# Patient Record
Sex: MALE | Race: WHITE | NOT HISPANIC OR LATINO | Employment: OTHER | URBAN - METROPOLITAN AREA
[De-identification: names, ages, dates, MRNs, and addresses within clinical notes are randomized per-mention and may not be internally consistent; named-entity substitution may affect disease eponyms.]

---

## 2017-10-16 ENCOUNTER — OFFICE VISIT (OUTPATIENT)
Dept: NEUROLOGY | Facility: CLINIC | Age: 82
End: 2017-10-16
Payer: MEDICARE

## 2017-10-16 VITALS
WEIGHT: 163.38 LBS | DIASTOLIC BLOOD PRESSURE: 74 MMHG | BODY MASS INDEX: 24.76 KG/M2 | HEART RATE: 58 BPM | HEIGHT: 68 IN | SYSTOLIC BLOOD PRESSURE: 122 MMHG

## 2017-10-16 DIAGNOSIS — R25.1 TREMOR: Primary | ICD-10-CM

## 2017-10-16 PROCEDURE — 99203 OFFICE O/P NEW LOW 30 MIN: CPT | Mod: PBBFAC | Performed by: NURSE PRACTITIONER

## 2017-10-16 PROCEDURE — 99205 OFFICE O/P NEW HI 60 MIN: CPT | Mod: S$PBB,,, | Performed by: NURSE PRACTITIONER

## 2017-10-16 PROCEDURE — 99999 PR PBB SHADOW E&M-NEW PATIENT-LVL III: CPT | Mod: PBBFAC,,, | Performed by: NURSE PRACTITIONER

## 2017-10-16 RX ORDER — TAMSULOSIN HYDROCHLORIDE 0.4 MG/1
1 CAPSULE ORAL NIGHTLY
Refills: 2 | COMMUNITY
Start: 2017-10-12

## 2017-10-16 RX ORDER — ATORVASTATIN CALCIUM 10 MG/1
10 TABLET, FILM COATED ORAL DAILY
Refills: 3 | COMMUNITY
Start: 2017-07-21 | End: 2021-04-18

## 2017-10-16 NOTE — PROGRESS NOTES
Name: Austin Linn  MRN: [id number]   CSN: [id number]      Date: 10-16-17      Referring physician:  Aaarefruddy Self  No address on file    Subjective:      Chief Complaint: tremors    History of Present Illness (HPI):    Austin Linn is a 81 y.o. right-handed male who presents today for an initial evaluation of tremor and is accompanied by wife (who is also seen in this office).     He says his internist told him he has ET. He has never taken medicine for this and has never felt meds were necessary. He still is not interested in meds for tremor control at this point but wants to have the tremor further evaluated. 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 He was more aware of it as day went on. 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     Tremor is aggravated by caffeine and soothed by glass of wine. If stressed, it is worse. When questioned, he thinks he only sees this in the RH.     Drinks coffee in AM and sometimes in afternoon. Aware of tremor at times when drinking coffee and tremor when drinking coffee, picking up/ carrying plate. Not aware of tremor with shaving.   Not aware of tremor with fine motor movements around house-like using drill bit- unless he has had coffee beforehand.    Handwriting is not always legible and is definitely different than it once was. Not sure if writing is smaller.      No shuffling. NOo balance issues.   Some muscle stiffness if sits too long. Notes in hips and upper thighs only.    No falls.   Feels he is slower when working at desk than he once was. Not significant but is aware. Physically, he is is not aware of slowness.     Mother had tremor. Brother is  5 yrs older and has recently developed tremor.     Review of Systems   Constitutional: Positive for fatigue.   HENT: Positive  "for hearing loss. Negative for trouble swallowing and voice change.    Eyes: Negative for visual disturbance.   Respiratory: Negative for choking and shortness of breath.    Cardiovascular: Negative for chest pain and leg swelling.   Gastrointestinal: Negative for constipation, diarrhea and nausea.   Genitourinary: Negative for frequency and urgency.   Musculoskeletal: Negative for gait problem and neck pain.   Skin: Negative for rash.        Airborne allergies - contact dermatitis   Neurological: Positive for tremors. Negative for dizziness, syncope, speech difficulty, weakness and light-headedness.   Hematological: Bruises/bleeds easily.   Psychiatric/Behavioral: Negative for agitation, confusion, hallucinations and sleep disturbance. The patient is not nervous/anxious.        Past Medical History: The patient  has no past medical history on file.    Social History: The patient  reports that he has never smoked. He has never used smokeless tobacco. He reports that he drinks about 4.2 oz of alcohol per week .    Family History: Their family history includes Cancer in his father; Heart disease in his brother and mother; Parkinsonism in his cousin; Tremor in his brother and mother.    Allergies: Review of patient's allergies indicates no known allergies.     Meds:   No current outpatient prescriptions on file prior to visit.     No current facility-administered medications on file prior to visit.        Objective:     Physical Exam:    Vitals:    10/16/17 1512   BP: 122/74   Pulse: (!) 58   Weight: 74.1 kg (163 lb 5.8 oz)   Height: 5' 8" (1.727 m)     Body mass index is 24.84 kg/m².    Constitutional  Well-developed, well-nourished, appears stated age   Cardiovascular  Radial pulses 2+ and symmetric, no LE edema bilaterally     ..  * Cranial nerves       - CN II  PERRL, visual fields full to confrontation     - CN III, IV, VI  Extraocular movements full, normal pursuits and saccades     - CN V  Sensation V1 - V3 " intact     - CN VII  Face strong and symmetric bilaterally     - CN VIII  Hearing intact bilaterally     - CN IX, X  Palate raises midline and symmetric     - CN XI  SCM and trapezius 5/5 bilaterally     - CN XII  Tongue midline   * Motor  Muscle bulk normal, strength 5/5 throughout   * Coordination  No dysmetria with finger-to-nose or heel-to-shin   * Gait  See below.   * Deep tendon reflexes  1+ and symmetric throughout     ..  * Specialized movement exam  No hypophonic speech.      No facial masking.     Cogwheel rigidity-  Neck- 1- RUE- slt but does not increase on diversion. LUE- essentially normal. RLE-ess normal  LLE- perhaps mild.      YESENIA- no bradykinesia. Little clumsy with B Heel toe only.      Subtle tremor with rest RH and subtle rest tremor emerged on diversion in LH.   With arms outstretched- slight tremor BH (RH>LH)- low frequency, moderate amplitude.   With arms pulled to core, increase slightly only on each side but still does not rise to mild.    No kinetic tremor.   Slight to nearing mild tremor with intention bilaterally.        No other dystonia, chorea, athetosis, myoclonus, or tics.       No motor impersistence.   Normal-based gait.   No shortened stride length.   No abnormal arm swing.     No postural instability.   SEE HANDWRITING SAMPLE BELOW             Laboratory Results:  No results found for any previous visit.       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 Caffeine aggravates and wine soothes it. He only notes in RH (dominant) but he does have in LH as well.   At this point, most consistent with Essential Tremor but is present at rest BH.   Query mild rigidity LLE only. Watch.   Not interested in meds.   Offered MRI brain. Not interested at this point. If changes his mind, he will let me know.  Call for problems or increased tremors.   Follow up one year, unless needed  sooner.     I spent 60 minutes face-to-face with the patient with >50% of the time spent with counseling and education regarding:  - results of data, diagnosis, and recommendations stated above  - the prognosis of tremor, ET, discussed PD as well as has questions  - risks and benefits of treatment  - importance of diet and exercise    Isaura Wilson, OMAIRA, NP-C  Division of Movement and Memory Disorders  [facility]  [phone]

## 2018-10-17 ENCOUNTER — OFFICE VISIT (OUTPATIENT)
Dept: NEUROLOGY | Facility: CLINIC | Age: 83
End: 2018-10-17
Payer: MEDICARE

## 2018-10-17 VITALS
WEIGHT: 167.56 LBS | SYSTOLIC BLOOD PRESSURE: 126 MMHG | HEART RATE: 58 BPM | HEIGHT: 68 IN | DIASTOLIC BLOOD PRESSURE: 71 MMHG | BODY MASS INDEX: 25.39 KG/M2

## 2018-10-17 DIAGNOSIS — G25.0 ESSENTIAL TREMOR: Primary | ICD-10-CM

## 2018-10-17 PROCEDURE — 99999 PR PBB SHADOW E&M-EST. PATIENT-LVL III: CPT | Mod: PBBFAC,,, | Performed by: NURSE PRACTITIONER

## 2018-10-17 PROCEDURE — 99213 OFFICE O/P EST LOW 20 MIN: CPT | Mod: PBBFAC | Performed by: NURSE PRACTITIONER

## 2018-10-17 PROCEDURE — 99214 OFFICE O/P EST MOD 30 MIN: CPT | Mod: S$PBB,,, | Performed by: NURSE PRACTITIONER

## 2018-10-17 RX ORDER — MUPIROCIN 20 MG/G
OINTMENT TOPICAL
Refills: 1 | COMMUNITY
Start: 2018-08-24 | End: 2021-04-18

## 2018-10-17 RX ORDER — DOXYCYCLINE 100 MG/1
100 CAPSULE ORAL 2 TIMES DAILY
Refills: 0 | COMMUNITY
Start: 2018-10-12 | End: 2021-04-18

## 2018-10-17 NOTE — PROGRESS NOTES
Name: Austin Linn  MRN: [id number]   CSN: [id number]      Date: 10-17-18      Referring physician:  Aaarefruddy Self  No address on file    Subjective:      Chief Complaint: tremor    History of Present Illness (HPI):    Austin Linn is a 82 y.o. right-handed male who presents today for a follow-up evaluation of tremor (ET)and is accompanied by wife. Initially seen in office 10-16-17.     Tremor does not typically bother him. He continues to be more aware of tremor in the RH but sees in LH as well. He does not feel tremor has increased since last visit.   Caffeine will increase tremor. Also notes tremor with stress and excitement.   Tremor does not affect his activities.  Notes that wine does help tremor.     Denies slowness.   Aware of muscle stiffness if sits too long watching a movie.  Balance fine. No falls.     In the spring, he was spednign a lto of time in flower beds and waas noting that the 3rd and 4th difigt of both hands were tight in the tendones. He wouel rub the fingers and he would get some relieft. He wondereerd if it was trigger finger. Hot water seemd to help aslo. As times has gone on, he as felt the RH is back to 100% and LH is 90%         History of Present Illness (HPI):     Austin Linn is a 81 y.o. right-handed male who presents today for an initial evaluation of tremor and is accompanied by wife (who is also seen in this office).      He says his internist told him he has ET. He has never taken medicine for this and has never felt meds were necessary. He still is not interested in meds for tremor control at this point but wants to have the tremor further evaluated. 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 He was more aware of it as day went on. 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       Tremor is aggravated by caffeine and soothed by glass of wine. If stressed, it is worse. When questioned, he thinks he only sees this in the RH.      Drinks coffee in AM and sometimes in afternoon. Aware of tremor at times when drinking coffee and tremor when drinking coffee, picking up/ carrying plate. Not aware of tremor with shaving.   Not aware of tremor with fine motor movements around house-like using drill bit- unless he has had coffee beforehand.    Handwriting is not always legible and is definitely different than it once was. Not sure if writing is smaller.       No shuffling. NOo balance issues.   Some muscle stiffness if sits too long. Notes in hips and upper thighs only.    No falls.   Feels he is slower when working at desk than he once was. Not significant but is aware. Physically, he is is not aware of slowness.      Mother had tremor. Brother is  5 yrs older and has recently developed tremor.          Review of Systems   Constitutional: Negative for appetite change.   HENT: Positive for drooling (some drooling if fall asleep on sofa). Negative for trouble swallowing.    Gastrointestinal: Negative for constipation, diarrhea and nausea.   Genitourinary: Negative.    Musculoskeletal: Negative for gait problem.   Neurological: Positive for tremors. Negative for headaches.   Psychiatric/Behavioral: Negative for dysphoric mood and sleep disturbance. The patient is not nervous/anxious.        Past Medical History: The patient  has no past medical history on file.    Social History: The patient  reports that  has never smoked. he has never used smokeless tobacco. He reports that he drinks about 4.2 oz of alcohol per week.    Family History: Their family history includes Cancer in his father; Heart disease in his brother and mother; Parkinsonism in his cousin; Tremor in his brother and mother.    Allergies: Patient has no known allergies.     Meds:   Current Outpatient Medications on File Prior to Visit  "  Medication Sig Dispense Refill    tamsulosin (FLOMAX) 0.4 mg Cp24 Take 1 capsule by mouth nightly.  2    atorvastatin (LIPITOR) 10 MG tablet Take 10 mg by mouth once daily.  3    doxycycline (MONODOX) 100 MG capsule Take 100 mg by mouth 2 (two) times daily.  0    mupirocin (BACTROBAN) 2 % ointment apply to healing wound TWICE DAILY AS DIRECTED  1     No current facility-administered medications on file prior to visit.        Objective:     Physical Exam:    Vitals:    10/17/18 1500   BP: 126/71   Pulse: (!) 58   Weight: 76 kg (167 lb 8.8 oz)   Height: 5' 8" (1.727 m)     Body mass index is 25.48 kg/m².    Constitutional  Well-developed, well-nourished, appears stated age   Ophthalmoscopic  No papilledema with no hemorrhages or exudates bilaterally   Cardiovascular  Radial pulses 2+ and symmetric, no LE edema bilaterally     ..  * Specialized movement exam  No hypophonic speech.    No facial masking.     No cogwheel rigidity.      Subtle bradykinesia with R FT only, otherwise normal.       Slight rest tremor RH on diversion.   Subtle tremor with arms outstretched RH>LH. Increase slt at core. Near mild intention tremor BH.        No other dystonia, chorea, athetosis, myoclonus, or tics.   No motor impersistence.   Normal-based gait.   No shortened stride length.   No abnormal arm swing.               Laboratory Results:  No visits with results within 3 Month(s) from this visit.   Latest known visit with results is:   No results found for any previous visit.           Assessment and Plan     Essential tremor        Medical Decision Making:    Stable.  Not bothersome and not interested in meds at this point.   If changes his mind or if symptoms progress, he will let me know.  Follow up annually.     I spent 30 minutes face-to-face with the patient and wife with >50% of the time spent with counseling and education regarding:  - results of data, diagnosis, and recommendations stated above  - the prognosis of " tremor    Isaura Wilson, OMAIRA, NP-C  Division of Movement and Memory Disorders  [facility]  [phone]

## 2020-01-17 ENCOUNTER — TELEPHONE (OUTPATIENT)
Dept: NEUROLOGY | Facility: CLINIC | Age: 85
End: 2020-01-17

## 2020-01-23 ENCOUNTER — OFFICE VISIT (OUTPATIENT)
Dept: NEUROLOGY | Facility: CLINIC | Age: 85
End: 2020-01-23
Payer: MEDICARE

## 2020-01-23 VITALS
BODY MASS INDEX: 25.48 KG/M2 | SYSTOLIC BLOOD PRESSURE: 156 MMHG | DIASTOLIC BLOOD PRESSURE: 73 MMHG | HEIGHT: 68 IN | HEART RATE: 61 BPM

## 2020-01-23 DIAGNOSIS — G25.0 ESSENTIAL TREMOR: Primary | ICD-10-CM

## 2020-01-23 PROCEDURE — 1159F MED LIST DOCD IN RCRD: CPT | Mod: HCNC,S$GLB,, | Performed by: NURSE PRACTITIONER

## 2020-01-23 PROCEDURE — 1101F PT FALLS ASSESS-DOCD LE1/YR: CPT | Mod: HCNC,CPTII,S$GLB, | Performed by: NURSE PRACTITIONER

## 2020-01-23 PROCEDURE — 99214 PR OFFICE/OUTPT VISIT, EST, LEVL IV, 30-39 MIN: ICD-10-PCS | Mod: HCNC,S$GLB,, | Performed by: NURSE PRACTITIONER

## 2020-01-23 PROCEDURE — 99999 PR PBB SHADOW E&M-EST. PATIENT-LVL III: CPT | Mod: PBBFAC,HCNC,, | Performed by: NURSE PRACTITIONER

## 2020-01-23 PROCEDURE — 1126F AMNT PAIN NOTED NONE PRSNT: CPT | Mod: HCNC,S$GLB,, | Performed by: NURSE PRACTITIONER

## 2020-01-23 PROCEDURE — 1101F PR PT FALLS ASSESS DOC 0-1 FALLS W/OUT INJ PAST YR: ICD-10-PCS | Mod: HCNC,CPTII,S$GLB, | Performed by: NURSE PRACTITIONER

## 2020-01-23 PROCEDURE — 1159F PR MEDICATION LIST DOCUMENTED IN MEDICAL RECORD: ICD-10-PCS | Mod: HCNC,S$GLB,, | Performed by: NURSE PRACTITIONER

## 2020-01-23 PROCEDURE — 99999 PR PBB SHADOW E&M-EST. PATIENT-LVL III: ICD-10-PCS | Mod: PBBFAC,HCNC,, | Performed by: NURSE PRACTITIONER

## 2020-01-23 PROCEDURE — 99214 OFFICE O/P EST MOD 30 MIN: CPT | Mod: HCNC,S$GLB,, | Performed by: NURSE PRACTITIONER

## 2020-01-23 PROCEDURE — 1126F PR PAIN SEVERITY QUANTIFIED, NO PAIN PRESENT: ICD-10-PCS | Mod: HCNC,S$GLB,, | Performed by: NURSE PRACTITIONER

## 2020-01-23 NOTE — PROGRESS NOTES
Name: Austin Linn  MRN: [id number]   CSN: [id number]      Date: 1-      Referring physician:  Surinder Self  No address on file    Subjective:      Chief Complaint: tremor    History of Present Illness (HPI):    Austin Linn is a 84 y.o. right-handed male who presents today for a follow-up evaluation of Essential Tremor and is accompanied by son and wife (who is also being seen today). Last seen in office 10-17-18. At that time, tremor was not bothersome and preferred not to treat.     Increased tremor with caffeine and stress.   Has not stopped caffeine. 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   No issues shaving.  Writing legible as long as avoids caffeine before he writes.   Soup can be a problem, otherwise no issue with eating.  Aware of tremor when brings cup to mouth.  If has glass of wine when they go out to dinner, no tremor. Does not drink regularly.       If sits for long times will feel stiff (more in joints).   Not aware of slowness.  Balance is okay, not as good as was.     Seeing cards. Wearing implantable heart monitor. When bends over, can feel lightheaded. No sycnope.   Cards told he is on cusp of needing PPM.        Review of Systems   HENT: Positive for drooling (seldom when sleep). Negative for trouble swallowing.    Gastrointestinal: Negative for constipation and diarrhea.   Musculoskeletal: Negative for gait problem.   Neurological: Positive for tremors. Negative for headaches.   Psychiatric/Behavioral: Negative for dysphoric mood and sleep disturbance. The patient is nervous/anxious (comes and goes).        Past Medical History: The patient  has no past medical history on file.    Social History: The patient  reports that he has never smoked. He has never used smokeless tobacco. He reports that he drinks about 7.0 standard drinks of alcohol per week.    Family History: Their family history includes Cancer in his father; Heart disease in his brother and mother; Parkinsonism in  "his cousin; Tremor in his brother and mother.    Allergies: Patient has no known allergies.     Meds:   Current Outpatient Medications on File Prior to Visit   Medication Sig Dispense Refill    tamsulosin (FLOMAX) 0.4 mg Cp24 Take 1 capsule by mouth nightly.  2    atorvastatin (LIPITOR) 10 MG tablet Take 10 mg by mouth once daily.  3    doxycycline (MONODOX) 100 MG capsule Take 100 mg by mouth 2 (two) times daily.  0    mupirocin (BACTROBAN) 2 % ointment apply to healing wound TWICE DAILY AS DIRECTED  1     No current facility-administered medications on file prior to visit.        Objective:     Physical Exam:    Vitals:    01/23/20 0835   BP: (!) 156/73   BP Location: Left arm   Patient Position: Sitting   Pulse: 61   Height: 5' 8" (1.727 m)     Body mass index is 25.48 kg/m².    Constitutional  Well-developed, well-nourished, appears stated age   Cardiovascular  Radial pulses 2+ and symmetric, no LE edema bilaterally     ..  Neurological    * Mental status     - Orientation Oriented to: person, place and situation     - Memory     Intact recent and remote     - Attention/concentration  Normal     - Language  spontaneous, fluent     - Fund of knowledge  Aware of current events     - Executive  Well-organized thoughts     ..  * Specialized movement exam  No hypophonic speech.    No facial masking.     No cogwheel rigidity.     No bradykinesia.    Slight rest tremor BH- L>R - only noted on diversion.   Slight postural BH- does not increase with repose.     Subtle kinetic tremor RH.   Mild intention tremor BH.   Subtle tremor noted LH with ambulation.       No other dystonia, chorea, athetosis, myoclonus, or tics.       No motor impersistence.   Normal-based gait.   No shortened stride length.   No abnormal arm swing.     No postural instability.                      Laboratory Results:  No visits with results within 3 Month(s) from this visit.   Latest known visit with results is:   No results found for any " "previous visit.           Assessment and Plan     Essential tremor        Medical Decision Making:    Not interested in treatment yet.   Watch. Noted rest tremor today, no other cardinal motor features.   Son asks if there are meds that he could take PRN tremor. Discussed primidone and gabapentin. Would avoid propranolol due to his apparent heart issue ("on cusp of needing PPM"- under care of cards).   Follow up annually, unless needed sooner.   If decides he'd like to try PRN med, he will let me know.     I spent 25 minutes face-to-face with the patient with >50% of the time spent with counseling and education regarding:  - results of data, diagnosis, and recommendations stated above  - the prognosis of tremor  - risks and benefits of propranolol, gabapentin and primidone  - importance of diet and exercise    Isaura Wilson, OMAIRA, NP-C  Division of Movement and Memory Disorders  [facility]  [phone]      "

## 2020-12-03 ENCOUNTER — TELEPHONE (OUTPATIENT)
Dept: NEUROLOGY | Facility: CLINIC | Age: 85
End: 2020-12-03

## 2020-12-03 NOTE — TELEPHONE ENCOUNTER
----- Message from Annia Louis sent at 12/3/2020 10:31 AM CST -----  Regarding: appt  Contact: pt @ [phone]  Pt calling to speak with someone in Dr. Wilson's office regarding scheduling an appt. Please call.

## 2020-12-30 ENCOUNTER — CLINICAL SUPPORT (OUTPATIENT)
Dept: URGENT CARE | Facility: CLINIC | Age: 85
End: 2020-12-30
Payer: MEDICARE

## 2020-12-30 DIAGNOSIS — Z13.9 ENCOUNTER FOR SCREENING: Primary | ICD-10-CM

## 2020-12-30 LAB
CTP QC/QA: YES
SARS-COV-2 RDRP RESP QL NAA+PROBE: NEGATIVE

## 2020-12-30 PROCEDURE — U0002 COVID-19 LAB TEST NON-CDC: HCPCS | Mod: QW,S$GLB,, | Performed by: NURSE PRACTITIONER

## 2020-12-30 PROCEDURE — U0002: ICD-10-PCS | Mod: QW,S$GLB,, | Performed by: NURSE PRACTITIONER

## 2021-04-15 ENCOUNTER — OFFICE VISIT (OUTPATIENT)
Dept: NEUROLOGY | Facility: CLINIC | Age: 86
End: 2021-04-15
Payer: MEDICARE

## 2021-04-15 VITALS
HEART RATE: 61 BPM | BODY MASS INDEX: 26.1 KG/M2 | WEIGHT: 172.19 LBS | DIASTOLIC BLOOD PRESSURE: 81 MMHG | HEIGHT: 68 IN | SYSTOLIC BLOOD PRESSURE: 164 MMHG

## 2021-04-15 DIAGNOSIS — G25.0 ESSENTIAL TREMOR: ICD-10-CM

## 2021-04-15 PROCEDURE — 3288F FALL RISK ASSESSMENT DOCD: CPT | Mod: CPTII,S$GLB,, | Performed by: NURSE PRACTITIONER

## 2021-04-15 PROCEDURE — 1126F AMNT PAIN NOTED NONE PRSNT: CPT | Mod: S$GLB,,, | Performed by: NURSE PRACTITIONER

## 2021-04-15 PROCEDURE — 99999 PR PBB SHADOW E&M-EST. PATIENT-LVL III: ICD-10-PCS | Mod: PBBFAC,,, | Performed by: NURSE PRACTITIONER

## 2021-04-15 PROCEDURE — 1126F PR PAIN SEVERITY QUANTIFIED, NO PAIN PRESENT: ICD-10-PCS | Mod: S$GLB,,, | Performed by: NURSE PRACTITIONER

## 2021-04-15 PROCEDURE — 1159F MED LIST DOCD IN RCRD: CPT | Mod: S$GLB,,, | Performed by: NURSE PRACTITIONER

## 2021-04-15 PROCEDURE — 1159F PR MEDICATION LIST DOCUMENTED IN MEDICAL RECORD: ICD-10-PCS | Mod: S$GLB,,, | Performed by: NURSE PRACTITIONER

## 2021-04-15 PROCEDURE — 99215 PR OFFICE/OUTPT VISIT, EST, LEVL V, 40-54 MIN: ICD-10-PCS | Mod: S$GLB,,, | Performed by: NURSE PRACTITIONER

## 2021-04-15 PROCEDURE — 1101F PT FALLS ASSESS-DOCD LE1/YR: CPT | Mod: CPTII,S$GLB,, | Performed by: NURSE PRACTITIONER

## 2021-04-15 PROCEDURE — 99999 PR PBB SHADOW E&M-EST. PATIENT-LVL III: CPT | Mod: PBBFAC,,, | Performed by: NURSE PRACTITIONER

## 2021-04-15 PROCEDURE — 99215 OFFICE O/P EST HI 40 MIN: CPT | Mod: S$GLB,,, | Performed by: NURSE PRACTITIONER

## 2021-04-15 PROCEDURE — 1101F PR PT FALLS ASSESS DOC 0-1 FALLS W/OUT INJ PAST YR: ICD-10-PCS | Mod: CPTII,S$GLB,, | Performed by: NURSE PRACTITIONER

## 2021-04-15 PROCEDURE — 3288F PR FALLS RISK ASSESSMENT DOCUMENTED: ICD-10-PCS | Mod: CPTII,S$GLB,, | Performed by: NURSE PRACTITIONER

## 2021-09-03 ENCOUNTER — PATIENT MESSAGE (OUTPATIENT)
Dept: NEUROLOGY | Facility: CLINIC | Age: 86
End: 2021-09-03

## 2022-02-18 ENCOUNTER — TELEPHONE (OUTPATIENT)
Dept: NEUROLOGY | Facility: CLINIC | Age: 87
End: 2022-02-18
Payer: MEDICARE

## 2022-02-18 NOTE — TELEPHONE ENCOUNTER
----- Message from Mohan Mendez sent at 2/18/2022  3:33 PM CST -----  Contact: PILY CREWS [[id number]]  Type: Patient Call Back    Who called:PILY CREWS [[id number]]    What is the request in detail: The patient would like to schedule yearly exam     Can the clinic reply by [facility]?    Would the patient rather a call back or a response via [facility]?     Best call back number: [phone] (mobile)    Additional Information:

## 2022-04-06 ENCOUNTER — OFFICE VISIT (OUTPATIENT)
Dept: NEUROLOGY | Facility: CLINIC | Age: 87
End: 2022-04-06
Payer: MEDICARE

## 2022-04-06 VITALS
HEIGHT: 68 IN | HEART RATE: 60 BPM | DIASTOLIC BLOOD PRESSURE: 71 MMHG | SYSTOLIC BLOOD PRESSURE: 111 MMHG | BODY MASS INDEX: 26.18 KG/M2

## 2022-04-06 DIAGNOSIS — G25.0 ESSENTIAL TREMOR: Primary | ICD-10-CM

## 2022-04-06 PROCEDURE — 1101F PT FALLS ASSESS-DOCD LE1/YR: CPT | Mod: CPTII,S$GLB,, | Performed by: NURSE PRACTITIONER

## 2022-04-06 PROCEDURE — 3074F PR MOST RECENT SYSTOLIC BLOOD PRESSURE < 130 MM HG: ICD-10-PCS | Mod: CPTII,S$GLB,, | Performed by: NURSE PRACTITIONER

## 2022-04-06 PROCEDURE — 1159F MED LIST DOCD IN RCRD: CPT | Mod: CPTII,S$GLB,, | Performed by: NURSE PRACTITIONER

## 2022-04-06 PROCEDURE — 1159F PR MEDICATION LIST DOCUMENTED IN MEDICAL RECORD: ICD-10-PCS | Mod: CPTII,S$GLB,, | Performed by: NURSE PRACTITIONER

## 2022-04-06 PROCEDURE — 1126F AMNT PAIN NOTED NONE PRSNT: CPT | Mod: CPTII,S$GLB,, | Performed by: NURSE PRACTITIONER

## 2022-04-06 PROCEDURE — 1126F PR PAIN SEVERITY QUANTIFIED, NO PAIN PRESENT: ICD-10-PCS | Mod: CPTII,S$GLB,, | Performed by: NURSE PRACTITIONER

## 2022-04-06 PROCEDURE — 3078F PR MOST RECENT DIASTOLIC BLOOD PRESSURE < 80 MM HG: ICD-10-PCS | Mod: CPTII,S$GLB,, | Performed by: NURSE PRACTITIONER

## 2022-04-06 PROCEDURE — 3074F SYST BP LT 130 MM HG: CPT | Mod: CPTII,S$GLB,, | Performed by: NURSE PRACTITIONER

## 2022-04-06 PROCEDURE — 1101F PR PT FALLS ASSESS DOC 0-1 FALLS W/OUT INJ PAST YR: ICD-10-PCS | Mod: CPTII,S$GLB,, | Performed by: NURSE PRACTITIONER

## 2022-04-06 PROCEDURE — 99214 OFFICE O/P EST MOD 30 MIN: CPT | Mod: S$GLB,,, | Performed by: NURSE PRACTITIONER

## 2022-04-06 PROCEDURE — 99999 PR PBB SHADOW E&M-EST. PATIENT-LVL III: ICD-10-PCS | Mod: PBBFAC,,, | Performed by: NURSE PRACTITIONER

## 2022-04-06 PROCEDURE — 1160F PR REVIEW ALL MEDS BY PRESCRIBER/CLIN PHARMACIST DOCUMENTED: ICD-10-PCS | Mod: CPTII,S$GLB,, | Performed by: NURSE PRACTITIONER

## 2022-04-06 PROCEDURE — 1160F RVW MEDS BY RX/DR IN RCRD: CPT | Mod: CPTII,S$GLB,, | Performed by: NURSE PRACTITIONER

## 2022-04-06 PROCEDURE — 3078F DIAST BP <80 MM HG: CPT | Mod: CPTII,S$GLB,, | Performed by: NURSE PRACTITIONER

## 2022-04-06 PROCEDURE — 99999 PR PBB SHADOW E&M-EST. PATIENT-LVL III: CPT | Mod: PBBFAC,,, | Performed by: NURSE PRACTITIONER

## 2022-04-06 PROCEDURE — 3288F FALL RISK ASSESSMENT DOCD: CPT | Mod: CPTII,S$GLB,, | Performed by: NURSE PRACTITIONER

## 2022-04-06 PROCEDURE — 3288F PR FALLS RISK ASSESSMENT DOCUMENTED: ICD-10-PCS | Mod: CPTII,S$GLB,, | Performed by: NURSE PRACTITIONER

## 2022-04-06 PROCEDURE — 99214 PR OFFICE/OUTPT VISIT, EST, LEVL IV, 30-39 MIN: ICD-10-PCS | Mod: S$GLB,,, | Performed by: NURSE PRACTITIONER

## 2022-04-06 NOTE — PROGRESS NOTES
Name: Austin Linn  MRN: [id number]   CSN: [id number]      Date: 4-6-22      Referring physician:  No referring provider defined for this encounter.    Subjective:      Chief Complaint: tremor    History of Present Illness (HPI):    Austin Linn is a 86 y.o. right-handed male who presents today for a follow-up evaluation of Essential Tremor since 1990s, more recently with subtle parkinsonian findings (not enough to warrant treatment) and is alone. (wife and son next door for her appt). 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 Last seen in office 4-15-21.    Tremor no worse. Aware of it when stressed.   Coffee triggers.   Wine with dinner softens it.  Weighted utensils didn't help, actually more of a bother.  Most meals are at home so does not matter.   Won't eat soup at restaurant.   If has to write, will plan before he drinks coffee.     Tremor mostly RH.   Notes with carrrying something.   Has trigger finger R ring finger.    Gets stiff if sits long time.   Trying to stay healthy and in shape to care for his wife. R hip had been bothering him. Went to PT and exercise, helpful. Also intentionally lost 10 lbs, also helpful.    Trying to get active again. Started walking.   Balance fine  No falls.  No shuffle or stumbles.     Had covid 12-21. No fever. Had episode of choking on saliva during this time.     Review of Systems   Constitutional: Negative for appetite change.   HENT: Positive for drooling (occ when sleeping). Negative for trouble swallowing.    Respiratory: Negative for cough and choking.    Gastrointestinal: Negative for abdominal pain, constipation, diarrhea and nausea.   Genitourinary:        Tamsulosin helps freq at night, helps   Musculoskeletal: Negative for gait problem.   Neurological: Positive for tremors.       Past Medical History: The patient  has a past medical history of Essential tremor.    Social History: The patient  reports that he has never smoked.  "He has never used smokeless tobacco. He reports current alcohol use of about 7.0 standard drinks of alcohol per week.    Family History: Their family history includes Cancer in his father; Heart disease in his brother and mother; Parkinsonism in his cousin; Tremor in his brother and mother.    Allergies: Patient has no known allergies.     Meds:   Current Outpatient Medications on File Prior to Visit   Medication Sig Dispense Refill    tamsulosin (FLOMAX) 0.4 mg Cp24 Take 1 capsule by mouth nightly.  2     No current facility-administered medications on file prior to visit.       Objective:     Physical Exam:    Vitals:    04/06/22 1423   BP: 111/71   BP Location: Left arm   Patient Position: Sitting   Pulse: 60   Height: 5' 8" (1.727 m)     Body mass index is 26.18 kg/m².    Constitutional  Well-developed, well-nourished, appears stated age  Appears somewhat tired.    Cardiovascular   no LE edema bilaterally     ..  Neurological    * Mental status     - Orientation Oriented to: person, place, time and situation     - Memory     Intact recent and remote     - Attention/concentration  Normal     - Language  spontaneous, fluent     - Fund of knowledge  Aware of current events     - Executive  Well-organized thoughts     Face symmetric.  EOMI.   Hearing intact to conversation.  No hypophonia or dysarthria.   No masked facies.      No voice tremor.   On diversion, rest tremor mild RH, slight LH.  With arms outstretched, subtle tremor BH (R>L). At core, mild tremor RH and slight LH but does not increase with repose.   Mild intention tremor BH.     YESENIA: very subtly darion with RFT & RHG without decrementing movement: BPS clumsy  BTT normal.     Rigidity: RUE on diversion only and perhaps subtle RLE, otherwise normal    Gait: Michael from seated position without diff. Initiates gait without hesitation. Normal stride and pace. Turns on pivot. Steady.               Laboratory Results:  No visits with results within 3 Month(s) " from this visit.   Latest known visit with results is:   Clinical Support on 12/30/2020   Component Date Value Ref Range Status    POC Rapid COVID 12/30/2020 Negative  Negative Final     Acceptable 12/30/2020 Yes   Final           Imaging:   No results found for this or any previous visit.      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 He is not bothered to the point he feels he needs treatment. His son asked him if he would like to have something on hand for as needed. Joaquin Oatessh is not interested at this time but understands to call me if he changes his mind.     As long as stable, Follow up one year. If worsens, Follow up sooner.     ..Total time: 38 minutes spent on the encounter, which includes face to face time and non-face to face time preparing to see the patient (eg, review of tests), Obtaining and/or reviewing separately obtained history, Documenting clinical information in the electronic or other health record, Independently interpreting results (not separately reported) and communicating results to the patient/family/caregiver, or Care coordination (not separately reported).       Isaura Wilson, OMAIRA, NP-C  Division of Movement and Memory Disorders  [facility]  [phone]

## 2023-02-17 ENCOUNTER — PATIENT MESSAGE (OUTPATIENT)
Dept: NEUROLOGY | Facility: CLINIC | Age: 88
End: 2023-02-17
Payer: MEDICARE

## 2023-03-08 ENCOUNTER — TELEPHONE (OUTPATIENT)
Dept: NEUROLOGY | Facility: CLINIC | Age: 88
End: 2023-03-08
Payer: MEDICARE

## 2023-03-08 NOTE — TELEPHONE ENCOUNTER
----- Message from Kady Reyes sent at 3/8/2023 11:17 AM CST -----  Contact: [phone]  the patient is calling to get scheduled for a appt.  Pt access tried but no appts are available.  the patient can be reached at.   [phone]

## 2023-04-04 ENCOUNTER — OFFICE VISIT (OUTPATIENT)
Dept: NEUROLOGY | Facility: CLINIC | Age: 88
End: 2023-04-04
Payer: MEDICARE

## 2023-04-04 VITALS
SYSTOLIC BLOOD PRESSURE: 117 MMHG | HEART RATE: 65 BPM | HEIGHT: 68 IN | WEIGHT: 173.81 LBS | DIASTOLIC BLOOD PRESSURE: 68 MMHG | BODY MASS INDEX: 26.34 KG/M2

## 2023-04-04 DIAGNOSIS — G25.0 ESSENTIAL TREMOR: Primary | ICD-10-CM

## 2023-04-04 PROCEDURE — 1159F PR MEDICATION LIST DOCUMENTED IN MEDICAL RECORD: ICD-10-PCS | Mod: CPTII,S$GLB,, | Performed by: NURSE PRACTITIONER

## 2023-04-04 PROCEDURE — 3288F FALL RISK ASSESSMENT DOCD: CPT | Mod: CPTII,S$GLB,, | Performed by: NURSE PRACTITIONER

## 2023-04-04 PROCEDURE — 1126F AMNT PAIN NOTED NONE PRSNT: CPT | Mod: CPTII,S$GLB,, | Performed by: NURSE PRACTITIONER

## 2023-04-04 PROCEDURE — 99215 OFFICE O/P EST HI 40 MIN: CPT | Mod: S$GLB,,, | Performed by: NURSE PRACTITIONER

## 2023-04-04 PROCEDURE — 99215 PR OFFICE/OUTPT VISIT, EST, LEVL V, 40-54 MIN: ICD-10-PCS | Mod: S$GLB,,, | Performed by: NURSE PRACTITIONER

## 2023-04-04 PROCEDURE — 1159F MED LIST DOCD IN RCRD: CPT | Mod: CPTII,S$GLB,, | Performed by: NURSE PRACTITIONER

## 2023-04-04 PROCEDURE — 1101F PT FALLS ASSESS-DOCD LE1/YR: CPT | Mod: CPTII,S$GLB,, | Performed by: NURSE PRACTITIONER

## 2023-04-04 PROCEDURE — 99999 PR PBB SHADOW E&M-EST. PATIENT-LVL III: CPT | Mod: PBBFAC,,, | Performed by: NURSE PRACTITIONER

## 2023-04-04 PROCEDURE — 99999 PR PBB SHADOW E&M-EST. PATIENT-LVL III: ICD-10-PCS | Mod: PBBFAC,,, | Performed by: NURSE PRACTITIONER

## 2023-04-04 PROCEDURE — 1160F PR REVIEW ALL MEDS BY PRESCRIBER/CLIN PHARMACIST DOCUMENTED: ICD-10-PCS | Mod: CPTII,S$GLB,, | Performed by: NURSE PRACTITIONER

## 2023-04-04 PROCEDURE — 3288F PR FALLS RISK ASSESSMENT DOCUMENTED: ICD-10-PCS | Mod: CPTII,S$GLB,, | Performed by: NURSE PRACTITIONER

## 2023-04-04 PROCEDURE — 1101F PR PT FALLS ASSESS DOC 0-1 FALLS W/OUT INJ PAST YR: ICD-10-PCS | Mod: CPTII,S$GLB,, | Performed by: NURSE PRACTITIONER

## 2023-04-04 PROCEDURE — 1126F PR PAIN SEVERITY QUANTIFIED, NO PAIN PRESENT: ICD-10-PCS | Mod: CPTII,S$GLB,, | Performed by: NURSE PRACTITIONER

## 2023-04-04 PROCEDURE — 1160F RVW MEDS BY RX/DR IN RCRD: CPT | Mod: CPTII,S$GLB,, | Performed by: NURSE PRACTITIONER

## 2023-04-04 NOTE — PROGRESS NOTES
Name: Austin Linn  MRN: [id number]   CSN: [id number]      Date: 4-4-23      Referring physician:  No referring provider defined for this encounter.    Subjective:      Chief Complaint: tremor     History of Present Illness (HPI):    Austin Linn is a 87 y.o. right-handed male with PPM and recent dx of inguinal hernia who presents today for a follow-up evaluation of Essential Tremor and is alone. Last seen 4-6-22. He is the primary caregiver of his wife who is known to this clinic and has Alzheimer's dementia.     Tremor most notable immediately after drinking coffee with caffeine  Stress on timeline can also increase      Looking for surgeon who has experience in robotics with inguinal hernia repair. Hoping this will be less invasive and quicker recovery since he is the primary caregiver for his wife.     Havnig some issues with R hip, which does impact gait at times. Had PT. Told tendon and had dry needling. Helped. Ws in mall walking and felt a pop and really mayra. Took 2 mos to get back to where he was. Feels he needs to go back to strecchign exercies.        Tremor is not worse.  Have compelte set fo weighted utnesils. Used few times and more torubl than worth.   Once he gets going during te h da, he deos nto ntice it.  Does nto stop him from functioning.     Wont order soup at restaurant. Will eat soup at home. If RH give problem will use LH.   Notes tremor if he stretches his hand out (long stretch).   Does not noteice at rest.        No falls. Does not feel unstady,        Mother had tremor  Brother had tremor in his later years.   4 children. No tremor in them.     Review of Systems   HENT:  Positive for drooling (usually when sleeping, longstanding). Negative for trouble swallowing.    Respiratory:  Negative for chest tightness and shortness of breath.    Cardiovascular:  Negative for chest pain, palpitations and leg swelling.   Musculoskeletal:  Positive for arthralgias.   Neurological:  Positive for  "tremors. Negative for syncope.   Psychiatric/Behavioral:  Negative for sleep disturbance.      Past Medical History: The patient  has a past medical history of Essential tremor.    Social History: The patient  reports that he has never smoked. He has never used smokeless tobacco. He reports current alcohol use of about 7.0 standard drinks per week.    Family History: Their family history includes Cancer in his father; Heart disease in his brother and mother; Parkinsonism in his cousin; Tremor in his brother and mother.    Allergies: Patient has no known allergies.     Meds:   Current Outpatient Medications on File Prior to Visit   Medication Sig Dispense Refill    tamsulosin (FLOMAX) 0.4 mg Cp24 Take 1 capsule by mouth nightly.  2     No current facility-administered medications on file prior to visit.       Objective:     Physical Exam:    Vitals:    04/04/23 1418   BP: 117/68   Pulse: 65   Weight: 78.8 kg (173 lb 13.3 oz)   Height: 5' 8" (1.727 m)     Body mass index is 26.43 kg/m².    Constitutional  Well-developed, well-nourished, appears stated age   Cardiovascular  no LE edema bilaterally     ..  * Specialized movement exam  No hypophonic speech.    No facial masking.     No cogwheel rigidity.      YESENIA:  No bradykinesia in hands or feet.     Subtle rest tremor RH, increases slt on diversion but still subtle   Outstretched no tremor  At core, slight postral tremor RH and suble LH    Slight inention tremor BH R>L     No other dystonia, chorea, athetosis, myoclonus, or tics.   No motor impersistence.   Normal-based gait.   No shortened stride length.   Slit reduced R arm swing.                    Laboratory Results:  No visits with results within 3 Month(s) from this visit.   Latest known visit with results is:   Clinical Support on 12/30/2020   Component Date Value Ref Range Status    POC Rapid COVID 12/30/2020 Negative  Negative Final     Acceptable 12/30/2020 Yes   Final           Imaging:   No " results found for this or any previous visit.        Assessment and Plan     Essential tremor        Medical Decision Making:    Not interested in meds for tremor control. Should he change his mind, he will let me know.  I will see if I can come with anyone that does robotics for hernia repair.   Follow up one year, unless needed sooner.     ..Total time: 43 minutes spent on the encounter, which includes face to face time and non-face to face time preparing to see the patient (eg, review of tests), Obtaining and/or reviewing separately obtained history, Documenting clinical information in the electronic or other health record, Independently interpreting results (not separately reported) and communicating results to the patient/family/caregiver, or Care coordination (not separately reported).       Isaura Wilson, OMAIRA, NP-C  Division of Movement and Memory Disorders  [facility]  [phone]

## 2023-04-18 ENCOUNTER — PATIENT MESSAGE (OUTPATIENT)
Dept: NEUROLOGY | Facility: CLINIC | Age: 88
End: 2023-04-18
Payer: MEDICARE

## 2023-05-01 ENCOUNTER — PATIENT MESSAGE (OUTPATIENT)
Dept: NEUROLOGY | Facility: CLINIC | Age: 88
End: 2023-05-01
Payer: MEDICARE

## 2024-02-28 ENCOUNTER — TELEPHONE (OUTPATIENT)
Dept: NEUROLOGY | Facility: CLINIC | Age: 89
End: 2024-02-28
Payer: MEDICARE

## 2024-02-28 NOTE — TELEPHONE ENCOUNTER
SW spoke with pt about his wife Corrine, who is being treated by Isaura Wilson CNP. Pt requested an appointment with Katie for himself.   SW forwarding to schedule

## 2024-03-12 ENCOUNTER — OFFICE VISIT (OUTPATIENT)
Dept: NEUROLOGY | Facility: CLINIC | Age: 89
End: 2024-03-12
Payer: MEDICARE

## 2024-03-12 VITALS
DIASTOLIC BLOOD PRESSURE: 73 MMHG | HEART RATE: 60 BPM | BODY MASS INDEX: 26.33 KG/M2 | SYSTOLIC BLOOD PRESSURE: 120 MMHG | WEIGHT: 173.19 LBS

## 2024-03-12 DIAGNOSIS — G25.0 ESSENTIAL TREMOR: Primary | ICD-10-CM

## 2024-03-12 DIAGNOSIS — Z95.0 PACEMAKER: ICD-10-CM

## 2024-03-12 PROCEDURE — 1126F AMNT PAIN NOTED NONE PRSNT: CPT | Mod: CPTII,S$GLB,, | Performed by: NURSE PRACTITIONER

## 2024-03-12 PROCEDURE — 99999 PR PBB SHADOW E&M-EST. PATIENT-LVL III: CPT | Mod: PBBFAC,,, | Performed by: NURSE PRACTITIONER

## 2024-03-12 PROCEDURE — 1160F RVW MEDS BY RX/DR IN RCRD: CPT | Mod: CPTII,S$GLB,, | Performed by: NURSE PRACTITIONER

## 2024-03-12 PROCEDURE — 1159F MED LIST DOCD IN RCRD: CPT | Mod: CPTII,S$GLB,, | Performed by: NURSE PRACTITIONER

## 2024-03-12 PROCEDURE — 99215 OFFICE O/P EST HI 40 MIN: CPT | Mod: S$GLB,,, | Performed by: NURSE PRACTITIONER

## 2024-03-12 PROCEDURE — 3288F FALL RISK ASSESSMENT DOCD: CPT | Mod: CPTII,S$GLB,, | Performed by: NURSE PRACTITIONER

## 2024-03-12 PROCEDURE — 1101F PT FALLS ASSESS-DOCD LE1/YR: CPT | Mod: CPTII,S$GLB,, | Performed by: NURSE PRACTITIONER

## 2024-03-12 RX ORDER — FERROUS SULFATE, DRIED 160(50) MG
1 TABLET, EXTENDED RELEASE ORAL 2 TIMES DAILY WITH MEALS
COMMUNITY

## 2024-03-12 RX ORDER — UBIDECARENONE 75 MG
1 CAPSULE ORAL DAILY
COMMUNITY

## 2024-03-12 NOTE — PROGRESS NOTES
Name: Austin Linn  MRN: [id number]   CSN: [id number]      Date: 3-12-24      Referring physician:  No referring provider defined for this encounter.    Subjective:      Chief Complaint: tremor    History of Present Illness (HPI):    Austin Linn is a 88 y.o. right-handed male with PPM, [place] (B aids)who presents today for a follow-up evaluation of Essential Tremor and is alone. He was last seen in office 4-4-23. At that time, tremor was not worse and was more notable after drinking caffeinated coffee. He was not interested in meds for tremor control. He is the primary caregiver of his wife who has Alzheimer's dementia.     Stress and caffeine increase tremor.  Otherwise, tremor about the same.   Generally not bothersome.   Wont order soup out.  Otherwise, can do just about anything he wants.  Carrying things he will note tremor.   RH is more of a tremor.  Notes only in his hands.     He is able to feed self.   Caregeviers  for wife 6 hours between 8-8 during day.   Has sitter all night. Helpful.    Weekends he has 24/7 care.   She seems to be at peace.      Hip issues- Started wearing SAS shoes, which has helped walking.   Uses hip pillow while sleeping, helps.   Hip is not as sensitive.  Fell  in the Fall. Was hurrying and fell in the kitchen. No injury, did not hit head. This was only fall.    Balance fine. Occ he has felt like he ws brief loss of balance. Not dizzy, lightheaded, or vertgio.       Dr ayala did robotic inguinal hernia repair last June. Did well with this.               Review of Systems   HENT:  Positive for hearing loss (B aids).    Musculoskeletal:  Positive for arthralgias (hip bothers but better with shoes and pillow at night). Negative for gait problem.   Neurological:  Negative for dizziness and tremors.   Psychiatric/Behavioral:  Negative for sleep disturbance.        Past Medical History: The patient  has a past medical history of Essential tremor.    Social History: The patient  reports that  he has never smoked. He has never used smokeless tobacco. He reports current alcohol use of about 7.0 standard drinks of alcohol per week.    Family History: Their family history includes Cancer in his father; Heart disease in his brother and mother; Parkinsonism in his cousin; Tremor in his brother and mother.    Allergies: Patient has no known allergies.     Meds:   Current Outpatient Medications on File Prior to Visit   Medication Sig Dispense Refill    tamsulosin (FLOMAX) 0.4 mg Cp24 Take 1 capsule by mouth nightly.  2    calcium-vitamin D3 (OS-LALITA 500 + D3) 500 mg-5 mcg (200 unit) per tablet Take 1 tablet by mouth 2 (two) times daily with meals.      cyanocobalamin 500 MCG tablet Take 1 tablet by mouth once daily.       No current facility-administered medications on file prior to visit.       Objective:     Physical Exam:    Vitals:    03/12/24 1415   BP: 120/73   BP Location: Left arm   Patient Position: Sitting   BP Method: Medium (Automatic)   Pulse: 60   Weight: 78.5 kg (173 lb 2.7 oz)     Body mass index is 26.33 kg/m².    Constitutional  Well-developed, well-nourished, appears stated age   Cardiovascular  no LE edema bilaterally     ..  * Specialized movement exam  Slightly hypophonic speech.   No dysarthria.  No voice tremor.    No facial masking.    Subtle rigidity BUE not does not increase on diversion.      YESENIA:  Subtle bradykinesia LPS but no decrementing.  BTT normal.      Slight  tremor with rest RH on diversion  Slight  postural tremor RH outstretched, at core nearing mild but does not increase with repose.  No kinetic tremor.  Slight to near mild intention tremor BH (R>L)  No head tremor.       No other dystonia, chorea, athetosis, myoclonus, or tics.       Arises without push.  Slightly stooped posture.   No motor impersistence.   Normal-based gait.   No shortened stride length.   Slightly reduced R arm swing.    Turns on pivot.  Normal pace.   Steady.          Laboratory Results:  No visits  with results within 3 Month(s) from this visit.   Latest known visit with results is:   Clinical Support on 12/30/2020   Component Date Value Ref Range Status    POC Rapid COVID 12/30/2020 Negative  Negative Final     Acceptable 12/30/2020 Yes   Final           Imaging:   No results found for this or any previous visit.        Assessment and Plan     Essential tremor    Pacemaker        Medical Decision Making:    Discussed exam findings.   He is not interested in medications at this point. He recalls our discussion last year about PRN med for tremor. He does not feel this is warranted at this time.   He is careful with balance and walking. SAS shoes and pillow for hip at night has helped, which has helped balance as well since not in pain.   He is primary caregiver of his wife with advanced Alzheimer's dementia. He has continued with sitters and maintains some of his social groups, which helps him.   He will call for problems or issues.  As long as stable, Follow up in one year.          ..Total time: 47 minutes spent on the encounter, which includes face to face time and non-face to face time preparing to see the patient (eg, review of tests), Obtaining and/or reviewing separately obtained history, Documenting clinical information in the electronic or other health record, Independently interpreting results (not separately reported) and communicating results to the patient/family/caregiver, or Care coordination (not separately reported).     Isaura Wilson DNP, NP-C  Division of Movement and Memory Disorders  [facility]  [phone]